# Patient Record
Sex: FEMALE | Race: WHITE
[De-identification: names, ages, dates, MRNs, and addresses within clinical notes are randomized per-mention and may not be internally consistent; named-entity substitution may affect disease eponyms.]

---

## 2018-12-24 ENCOUNTER — HOSPITAL ENCOUNTER (OUTPATIENT)
Dept: HOSPITAL 62 - LC | Age: 28
Discharge: HOME | End: 2018-12-24
Attending: OBSTETRICS & GYNECOLOGY
Payer: MEDICAID

## 2018-12-24 DIAGNOSIS — Z34.93: Primary | ICD-10-CM

## 2018-12-24 PROCEDURE — 4A1HXCZ MONITORING OF PRODUCTS OF CONCEPTION, CARDIAC RATE, EXTERNAL APPROACH: ICD-10-PCS | Performed by: OBSTETRICS & GYNECOLOGY

## 2018-12-24 PROCEDURE — 59025 FETAL NON-STRESS TEST: CPT

## 2019-01-17 ENCOUNTER — HOSPITAL ENCOUNTER (OUTPATIENT)
Dept: HOSPITAL 62 - LC | Age: 29
Discharge: HOME | End: 2019-01-17
Attending: OBSTETRICS & GYNECOLOGY
Payer: MEDICAID

## 2019-01-17 DIAGNOSIS — Z3A.35: ICD-10-CM

## 2019-01-17 DIAGNOSIS — O47.03: Primary | ICD-10-CM

## 2019-01-17 LAB
ADD MANUAL DIFF: NO
APPEARANCE UR: (no result)
APTT PPP: YELLOW S
BARBITURATES UR QL SCN: NEGATIVE
BASOPHILS # BLD AUTO: 0.1 10^3/UL (ref 0–0.2)
BASOPHILS NFR BLD AUTO: 0.6 % (ref 0–2)
BILIRUB UR QL STRIP: NEGATIVE
EOSINOPHIL # BLD AUTO: 0.1 10^3/UL (ref 0–0.6)
EOSINOPHIL NFR BLD AUTO: 0.8 % (ref 0–6)
ERYTHROCYTE [DISTWIDTH] IN BLOOD BY AUTOMATED COUNT: 16 % (ref 11.5–14)
GLUCOSE UR STRIP-MCNC: NEGATIVE MG/DL
HCT VFR BLD CALC: 35.4 % (ref 36–47)
HGB BLD-MCNC: 12.2 G/DL (ref 12–15.5)
KETONES UR STRIP-MCNC: NEGATIVE MG/DL
LYMPHOCYTES # BLD AUTO: 1.6 10^3/UL (ref 0.5–4.7)
LYMPHOCYTES NFR BLD AUTO: 11.2 % (ref 13–45)
MCH RBC QN AUTO: 29.9 PG (ref 27–33.4)
MCHC RBC AUTO-ENTMCNC: 34.4 G/DL (ref 32–36)
MCV RBC AUTO: 87 FL (ref 80–97)
METHADONE UR QL SCN: NEGATIVE
MONOCYTES # BLD AUTO: 0.8 10^3/UL (ref 0.1–1.4)
MONOCYTES NFR BLD AUTO: 5.5 % (ref 3–13)
NEUTROPHILS # BLD AUTO: 11.4 10^3/UL (ref 1.7–8.2)
NEUTS SEG NFR BLD AUTO: 81.9 % (ref 42–78)
NITRITE UR QL STRIP: NEGATIVE
PCP UR QL SCN: NEGATIVE
PH UR STRIP: 7 [PH] (ref 5–9)
PLATELET # BLD: 264 10^3/UL (ref 150–450)
PROT UR STRIP-MCNC: NEGATIVE MG/DL
RBC # BLD AUTO: 4.08 10^6/UL (ref 3.72–5.28)
SP GR UR STRIP: 1.01
TOTAL CELLS COUNTED % (AUTO): 100 %
URINE AMPHETAMINES SCREEN: NEGATIVE
URINE BENZODIAZEPINES SCREEN: NEGATIVE
URINE COCAINE SCREEN: NEGATIVE
URINE MARIJUANA (THC) SCREEN: NEGATIVE
UROBILINOGEN UR-MCNC: NEGATIVE MG/DL (ref ?–2)
WBC # BLD AUTO: 13.9 10^3/UL (ref 4–10.5)

## 2019-01-17 PROCEDURE — 94760 N-INVAS EAR/PLS OXIMETRY 1: CPT

## 2019-01-17 PROCEDURE — 4A1HXCZ MONITORING OF PRODUCTS OF CONCEPTION, CARDIAC RATE, EXTERNAL APPROACH: ICD-10-PCS | Performed by: OBSTETRICS & GYNECOLOGY

## 2019-01-17 PROCEDURE — 93010 ELECTROCARDIOGRAM REPORT: CPT

## 2019-01-17 PROCEDURE — 85025 COMPLETE CBC W/AUTO DIFF WBC: CPT

## 2019-01-17 PROCEDURE — 80307 DRUG TEST PRSMV CHEM ANLYZR: CPT

## 2019-01-17 PROCEDURE — 59025 FETAL NON-STRESS TEST: CPT

## 2019-01-17 PROCEDURE — 36415 COLL VENOUS BLD VENIPUNCTURE: CPT

## 2019-01-17 PROCEDURE — 81005 URINALYSIS: CPT

## 2019-01-17 PROCEDURE — 93005 ELECTROCARDIOGRAM TRACING: CPT

## 2019-01-17 NOTE — EKG REPORT
SEVERITY:- OTHERWISE NORMAL ECG -

SINUS TACHYCARDIA

:

Confirmed by: Alexx Li 17-Jan-2019 23:03:52

## 2019-01-17 NOTE — NON STRESS TEST REPORT
=================================================================

Non Stress Test

=================================================================

Datetime Report Generated by CPN: 2019 19:01

   

   

=================================================================

DEMOGRAPHIC

=================================================================

   

EGA NST:  35.4

   

=================================================================

INDICATION

=================================================================

   

Indication for Study:   labor

   

=================================================================

MONITORING

=================================================================

   

Monitor Explained:  Monitor Explained; Test Explained; Patient

   Verbalized Understanding

Time on Monitor:  2019 14:07

Time off Monitor:  2019 16:26

NST Duration:  139

   

=================================================================

NST INTERVENTIONS

=================================================================

   

NST Interventions:  PO Hydration; IV Fluids; Reposition Patient

Physician Notified NST:  Dr. Younger

   

=================================================================

BABY A

=================================================================

   

Fetal Movement :  Present

Contraction Frequency :  6-9

FHR Baseline :  145

Accelerations :  15X15

Decelerations :  None

Variability :  Moderate 6-25bpm

NST Review:  Meets Criteria for Reactive NST

NST Review and Verified By :  MINISTERIO Robbins RN

NST Results:  Reactive

   

=================================================================

NST REPORT

=================================================================

   

Report Trigger:  Send Report

## 2019-01-24 ENCOUNTER — HOSPITAL ENCOUNTER (INPATIENT)
Dept: HOSPITAL 62 - LC | Age: 29
LOS: 2 days | Discharge: HOME | End: 2019-01-26
Attending: OBSTETRICS & GYNECOLOGY | Admitting: OBSTETRICS & GYNECOLOGY
Payer: MEDICAID

## 2019-01-24 DIAGNOSIS — Z3A.36: ICD-10-CM

## 2019-01-24 LAB
ADD MANUAL DIFF: NO
APPEARANCE UR: (no result)
APTT PPP: YELLOW S
BARBITURATES UR QL SCN: NEGATIVE
BASOPHILS # BLD AUTO: 0.1 10^3/UL (ref 0–0.2)
BASOPHILS NFR BLD AUTO: 1.1 % (ref 0–2)
BILIRUB UR QL STRIP: NEGATIVE
CHLAM PCR: NOT DETECTED
EOSINOPHIL # BLD AUTO: 0.1 10^3/UL (ref 0–0.6)
EOSINOPHIL NFR BLD AUTO: 0.7 % (ref 0–6)
ERYTHROCYTE [DISTWIDTH] IN BLOOD BY AUTOMATED COUNT: 15.9 % (ref 11.5–14)
GLUCOSE UR STRIP-MCNC: >=500 MG/DL
GON PCR: NOT DETECTED
HCT VFR BLD CALC: 38.2 % (ref 36–47)
HGB BLD-MCNC: 13 G/DL (ref 12–15.5)
KETONES UR STRIP-MCNC: NEGATIVE MG/DL
LYMPHOCYTES # BLD AUTO: 1.9 10^3/UL (ref 0.5–4.7)
LYMPHOCYTES NFR BLD AUTO: 14.6 % (ref 13–45)
MCH RBC QN AUTO: 29.6 PG (ref 27–33.4)
MCHC RBC AUTO-ENTMCNC: 34.1 G/DL (ref 32–36)
MCV RBC AUTO: 87 FL (ref 80–97)
METHADONE UR QL SCN: NEGATIVE
MONOCYTES # BLD AUTO: 0.8 10^3/UL (ref 0.1–1.4)
MONOCYTES NFR BLD AUTO: 6.3 % (ref 3–13)
NEUTROPHILS # BLD AUTO: 9.8 10^3/UL (ref 1.7–8.2)
NEUTS SEG NFR BLD AUTO: 77.3 % (ref 42–78)
NITRITE UR QL STRIP: NEGATIVE
PCP UR QL SCN: NEGATIVE
PH UR STRIP: 7 [PH] (ref 5–9)
PLATELET # BLD: 227 10^3/UL (ref 150–450)
PROT UR STRIP-MCNC: 100 MG/DL
RBC # BLD AUTO: 4.41 10^6/UL (ref 3.72–5.28)
SP GR UR STRIP: 1.01
TOTAL CELLS COUNTED % (AUTO): 100 %
URINE AMPHETAMINES SCREEN: NEGATIVE
URINE BENZODIAZEPINES SCREEN: NEGATIVE
URINE COCAINE SCREEN: NEGATIVE
URINE MARIJUANA (THC) SCREEN: NEGATIVE
UROBILINOGEN UR-MCNC: NEGATIVE MG/DL (ref ?–2)
WBC # BLD AUTO: 12.7 10^3/UL (ref 4–10.5)

## 2019-01-24 PROCEDURE — 86850 RBC ANTIBODY SCREEN: CPT

## 2019-01-24 PROCEDURE — 86901 BLOOD TYPING SEROLOGIC RH(D): CPT

## 2019-01-24 PROCEDURE — 87491 CHLMYD TRACH DNA AMP PROBE: CPT

## 2019-01-24 PROCEDURE — 86592 SYPHILIS TEST NON-TREP QUAL: CPT

## 2019-01-24 PROCEDURE — 36415 COLL VENOUS BLD VENIPUNCTURE: CPT

## 2019-01-24 PROCEDURE — 85027 COMPLETE CBC AUTOMATED: CPT

## 2019-01-24 PROCEDURE — 87591 N.GONORRHOEAE DNA AMP PROB: CPT

## 2019-01-24 PROCEDURE — 82962 GLUCOSE BLOOD TEST: CPT

## 2019-01-24 PROCEDURE — 80307 DRUG TEST PRSMV CHEM ANLYZR: CPT

## 2019-01-24 PROCEDURE — 88307 TISSUE EXAM BY PATHOLOGIST: CPT

## 2019-01-24 PROCEDURE — 86900 BLOOD TYPING SEROLOGIC ABO: CPT

## 2019-01-24 PROCEDURE — 81001 URINALYSIS AUTO W/SCOPE: CPT

## 2019-01-24 PROCEDURE — 85025 COMPLETE CBC W/AUTO DIFF WBC: CPT

## 2019-01-24 RX ADMIN — SENNOSIDES, DOCUSATE SODIUM SCH: 50; 8.6 TABLET, FILM COATED ORAL at 13:57

## 2019-01-24 RX ADMIN — FERROUS SULFATE TAB 325 MG (65 MG ELEMENTAL FE) SCH: 325 (65 FE) TAB at 13:57

## 2019-01-24 RX ADMIN — IBUPROFEN SCH MG: 800 TABLET, FILM COATED ORAL at 21:16

## 2019-01-24 RX ADMIN — DOCUSATE SODIUM SCH: 100 CAPSULE, LIQUID FILLED ORAL at 13:57

## 2019-01-24 RX ADMIN — FERROUS SULFATE TAB 325 MG (65 MG ELEMENTAL FE) SCH MG: 325 (65 FE) TAB at 18:26

## 2019-01-24 RX ADMIN — IBUPROFEN SCH MG: 800 TABLET, FILM COATED ORAL at 15:30

## 2019-01-24 RX ADMIN — Medication SCH: at 13:57

## 2019-01-24 RX ADMIN — DOCUSATE SODIUM SCH MG: 100 CAPSULE, LIQUID FILLED ORAL at 18:26

## 2019-01-24 NOTE — WARNING SIGNS IN BABIES
=================================================================

VOD Warning Signs

=================================================================

Datetime Report Generated by [facility]: 01/24/2019 11:30

   

VOD#608 -Warning Signs in Babies:  Needs to be viewed.    (12/24/2018

   08:47:Linda Ortiz RN)

## 2019-01-24 NOTE — DELIVERY SUMMARY
=================================================================

Del Sum A-C

=================================================================

Datetime Report Generated by CPN: 2019 11:32

   

   

=================================================================

DELIVERY PERSONNEL

=================================================================

   

DELIVERY PERSONNEL:  [id number]

Delivery Doctor::  Jalyn Pool CNM

Labor and Delivery Nurse::  José Morocho RN

Labor and Delivery Nurse::  Linda Alford RN

Nursery Nurse::  Haylie Richmond RN

Nursery Nurse::  Devika

Student Observers::  Rima Casey/MARANDA:  Hayley Eaton CNA II

   

=================================================================

MATERNAL INFORMATION

=================================================================

   

Delivery Anesthesia:  Epidural

Medications After Delivery:  Pitocin Drip 20 Units/1000ml NSS

Maternal Complications:  Precipitous Labor (<3hrs)

Provider Comments:   of viable female infant, KEYLA, loose NC x 1.

   Baby vigorous and crying, placed on pts abdoman. Cord cut and

   clamped after one minute. PCN x 2 doses infused for unknown GBS

   status.  Perinuem intact. Placenta S/C/I, ffw/ decreased lochia.

   Mother and baby in stable condition, pt plans to breast and bottle

   feed.  Minimal bleeding. Apgars 8,9

   Attending MD is Dr Venegas

   

=================================================================

LABOR SUMMARY

=================================================================

   

EDC:  2019 00:00

No. Babies in Womb:  1

 Attempted:  No

Labor Anesthesia:  Epidural

   

=================================================================

LABOR INFORMATION

=================================================================

   

Reason for Induction:  Not Applicable

Onset of Labor:  2019 04:32

Complete Dilatation:  2019 08:54

Oxytocin:  N/A

Group B Beta Strep:  Unknown

Antibiotics # of Doses:  2

Antibiotics Time of Last Dose:  0849

Name of Antibiotic Given:  PCN

Steroids Given:  None

Reason Steroids Not Administered:  Not Applicable

   

=================================================================

MEMBRANES

=================================================================

   

Membranes Rupture Method:  Spontaneous

Rupture of Membranes:  2019 03:00

Length of Rupture (hr):  6.05

Amniotic Fluid Color:  Clear

Amniotic Fluid Amount:  Moderate

Amniotic Fluid Odor:  Normal

   

=================================================================

STAGES OF LABOR

=================================================================

   

Stage 1 hr:  4

Stage 1 min:  22

Stage 2 hr:  0

Stage 2 min:  9

Stage 3 hr:  0

Stage 3 min:  3

Total Time in Labor hr:  4

Total Time in Labor min:  34

   

=================================================================

VAGINAL DELIVERY

=================================================================

   

Episiotomy:  None

Laceration #1:  None

Laceration Extension #1:  N/A

Laceration Repair:  Not Applicable

Sponge Count Correct:  N/A

   

=================================================================

CSECTION DELIVERY

=================================================================

   

Primary Indication:  N/A

Secondary Indication:  N/A

CSection Incidence:  N/A

Labor:  N/A

Elective:  N/A

CSection Incision:  N/A

   

=================================================================

BABY A INFORMATION

=================================================================

   

Infant Delivery Date/Time:  2019 09:03

Method of Delivery:  Vaginal

Born in Route :  No

:  N/A

Forceps:  N/A

Vacuum Extraction:  N/A

Shoulder Dystocia :  No

   

=================================================================

PRESENTATION/POSITION BABY A

=================================================================

   

Presentation:  Cephalic

Cephalic Presentation:  Vertex

Vertex Position:  Left Occipital Anterior

Breech Presentation:  N/A

   

=================================================================

PLACENTA INFORMATION BABY A

=================================================================

   

Placenta Delivery Time :  2019 09:06

Placenta Method of Delivery:  Spontaneous

Placenta Status:  Delivered

   

=================================================================

APGAR SCORES BABY A

=================================================================

   

Heart Rate 1 min:  >100 bpm

Resp Effort 1 min:  Good Cry

Reflex Irritability 1 min:  Cough or Sneeze or Pulls Away

Muscle Tone 1 min:  Active Motion

Color 1 min:  Blue/Pale

Resuscitation Effort 1 min:  Tactile Stimulation

APGAR SCORE 1 MIN:  8

Heart Rate 5 min:  >100 bpm

Resp Effort 5 min:  Good Cry

Reflex Irritability 5 min:  Cough or Sneeze or Pulls Away

Muscle Tone 5 min:  Active Motion

Color 5 min:  Body Pink, Extremities Blue

APGAR SCORE 5 MIN:  9

   

=================================================================

INFANT INFORMATION BABY A

=================================================================

   

Gestational Age at Delivery:  36.4

Gestational Status:  Late - 34- 36.6 Weeks

Infant Outcome :  Liveborn

Infant Condition :  Stable

Infant Sex:  Female

   

=================================================================

IDENTIFICATION BABY A

=================================================================

   

Infant Verification Date/Time:  2019 09:20

ID Band Number:  [id number]

Mother's Name Verified:  Yes

Infant Medical Record Number:  [id number]

RN Verifying Infant:  CSarah alford, RN/ JSarah Morocho, RN

   

=================================================================

CORD INFORMATION BABY A

=================================================================

   

No. Cord Vessels:  3

Nuchal Cord :  Around Neck x2, Loose

Cord Blood Taken:  Yes-For Eval (Mom's Blood Type - or O+)

Infant Suction:  None

   

=================================================================

ASSESSMENT BABY A

=================================================================

   

Infant Complications:  None

Physical Findings at Delivery:  Within Normal Limits

Infant Respirations:  Appears Normal

Skin to Skin:  Yes

Neonatologist/ALS Called :  No

Infant Care By:  BALDEMAR Richmond RN

Transferred To:  Remains with Mother

   

=================================================================

SIGNATURES

=================================================================

   

Assignment:  Michael Venegas MD

Signature:  Electronically signed by Jalyn Pool CNM on 2019

   at 09:56  with User ID: [email or web address]

:  Electronically signed by Jalyn Pool CNM on 2019 at 09:56 

   with User ID: [email or web address]

## 2019-01-24 NOTE — ADMISSION PHYSICAL
=================================================================



=================================================================

Datetime Report Generated by CPN: 2019 05:49

   

   

=================================================================

CURRENT ADMISSION

=================================================================

   

Chief Complaint:  Uterine Contractions; Suspected Ruptured Membranes

Indication for Induction:  Not Applicable

Admit Impression :  , Intrauterine Pregnancy; Ruptured Membranes

Admit Plan:  Admit to Unit; Initiate Labor Protocol

   

=================================================================

ALLERGIES

=================================================================

   

Medication Allergies:  No

Medication Allergies:  No Known Allergies (2019)

Latex:  No Latex Allergies

   

=================================================================

OBSTETRICAL HISTORY

=================================================================

   

EDC:  2019 00:00

:  3

Para:  2

Term:  0

:  2

SAB:  0

IAB:  0

Ectopic:  0

Livin

Cesareans:  0

VBACs:  0

Multiple Births:  0

Gestational Diabetes:  Yes

Rh Sensitization:  No

Incompetent Cervix:  Yes

KURT:  No

Infertility:  No

ART Treatment:  No

Uterine Anomaly:  No

IUGR:  No

Hx Previous C/S:  No

Macrosomia:  No

Hx Loss/Stillborn:  No

PIH:  No

Hx  Death:  No

Placenta Previa/Abruption:  No

Depression/PP Depression:  No

PTL/PROM:  No

Post Partum Hemorrhage:  No

Current Pregnancy Procedures:  Ultrasound; NST

Obstetrical History Comments:  G1:  34 weeks 

   G2:  36 weeks  : current: on P17

   

=================================================================

***SEE PRENATAL RECORDS***

=================================================================

   

Alcohol:  No

Marijuana :  No

Cocaine:  No

Other Illicit Drugs:  No

Cigarettes:  Never Smoker. [id number]

   

=================================================================

MEDICAL HISTORY

=================================================================

   

Diabetes:  Yes

Diabetes Type:  Gestational Diabetes

Blood Transfusion:  No

Pulmonary Disease (Asthma, TB):  No

Breast Disease:  No

Hypertension:  No

Gyn Surgery:  No

Heart Disease:  No

Hosp/Surgery:  No

Autoimmune Disorder:  No

Anesthetic Complications:  No

Kidney Disease:  Yes

Abnormal Pap Smear:  No

Neuro/Epilepsy:  No

Psychiatric Disorders:  No

Other Medical Diseases:  No

Hepatitis/Liver Disease:  No

Significant Family History:  No

Varicosities/Phlebitis:  No

Trauma/Violence :  No

Thyroid Dysfunction:  No

   

=================================================================

INFECTIOUS HISTORY

=================================================================

   

Gonorrhea:  No

Genital Herpes:  No

Chlamydia:  No

Tuberculosis:  No

Syphilis:  No

Hepatitis:  No

HIV/AIDS Exposure:  No

Rash or Viral Illness:  No

HPV:  No

   

=================================================================

PHYSICAL EXAM

=================================================================

   

General:  Normal

HEENT:  Normal

Neurologic:  Normal

Thyroid:  Normal

Heart:  Normal

Lungs:  Normal

Breast:  Deferred

Back:  Normal

Abdomen:  Normal

Genitourinary Exam:  Normal

Extremities:  Normal

DTRs:  Normal

Pelvic Type:  Adequate

Vital Signs:  Reviewed

   

=================================================================

VAGINAL EXAM

=================================================================

   

Dilatation:  3

Effacement:  80

Station:  -2

   

=================================================================

MEMBRANES

=================================================================

   

Pooling:  Negative

Membranes:  Intact

   

=================================================================

FETUS A

=================================================================

   

EGA:  36.4

Monitoring:  External US

FHR- Baseline:  140

Variability:  Moderate 6-25bpm

Decelerations:  None

FHR Category:  Category I

Fetal Presentation:  Vertex

Admit Comment:  Admit for delivery

   

=================================================================

PLANS FOR LABOR AND DELIVERY

=================================================================

   

Labor and Delivery:  Birth Plan

Pain Management:  Epidural

Feeding Preference:  Both

Benefit of Breast Feed Discussed:  Yes

Circumcision:  N/A

   

=================================================================

INFORMED CONSENT

=================================================================

   

Signature:  Electronically signed by Juan Vidal MD ([email or web address]) on

   2019 at 05:48  with User ID: [email or web address]

## 2019-01-25 LAB
ERYTHROCYTE [DISTWIDTH] IN BLOOD BY AUTOMATED COUNT: 15.9 % (ref 11.5–14)
HCT VFR BLD CALC: 38.6 % (ref 36–47)
HGB BLD-MCNC: 13 G/DL (ref 12–15.5)
MCH RBC QN AUTO: 29.5 PG (ref 27–33.4)
MCHC RBC AUTO-ENTMCNC: 33.7 G/DL (ref 32–36)
MCV RBC AUTO: 87 FL (ref 80–97)
PLATELET # BLD: 202 10^3/UL (ref 150–450)
RBC # BLD AUTO: 4.42 10^6/UL (ref 3.72–5.28)
WBC # BLD AUTO: 13.4 10^3/UL (ref 4–10.5)

## 2019-01-25 RX ADMIN — SENNOSIDES, DOCUSATE SODIUM SCH EACH: 50; 8.6 TABLET, FILM COATED ORAL at 10:31

## 2019-01-25 RX ADMIN — IBUPROFEN SCH MG: 800 TABLET, FILM COATED ORAL at 05:44

## 2019-01-25 RX ADMIN — IBUPROFEN SCH MG: 800 TABLET, FILM COATED ORAL at 14:27

## 2019-01-25 RX ADMIN — IBUPROFEN SCH MG: 800 TABLET, FILM COATED ORAL at 23:32

## 2019-01-25 RX ADMIN — Medication SCH CAP: at 10:31

## 2019-01-25 RX ADMIN — DOCUSATE SODIUM SCH MG: 100 CAPSULE, LIQUID FILLED ORAL at 10:31

## 2019-01-25 RX ADMIN — FERROUS SULFATE TAB 325 MG (65 MG ELEMENTAL FE) SCH MG: 325 (65 FE) TAB at 10:31

## 2019-01-26 VITALS — DIASTOLIC BLOOD PRESSURE: 66 MMHG | SYSTOLIC BLOOD PRESSURE: 120 MMHG

## 2019-01-26 RX ADMIN — IBUPROFEN SCH MG: 800 TABLET, FILM COATED ORAL at 13:21

## 2019-01-26 RX ADMIN — FERROUS SULFATE TAB 325 MG (65 MG ELEMENTAL FE) SCH MG: 325 (65 FE) TAB at 11:51

## 2019-01-26 RX ADMIN — FERROUS SULFATE TAB 325 MG (65 MG ELEMENTAL FE) SCH MG: 325 (65 FE) TAB at 11:52

## 2019-01-26 RX ADMIN — DOCUSATE SODIUM SCH MG: 100 CAPSULE, LIQUID FILLED ORAL at 18:03

## 2019-01-26 RX ADMIN — DOCUSATE SODIUM SCH MG: 100 CAPSULE, LIQUID FILLED ORAL at 11:51

## 2019-01-26 RX ADMIN — SENNOSIDES, DOCUSATE SODIUM SCH EACH: 50; 8.6 TABLET, FILM COATED ORAL at 11:52

## 2019-01-26 RX ADMIN — FERROUS SULFATE TAB 325 MG (65 MG ELEMENTAL FE) SCH MG: 325 (65 FE) TAB at 18:03

## 2019-01-26 RX ADMIN — Medication SCH CAP: at 11:50

## 2019-01-26 RX ADMIN — IBUPROFEN SCH: 800 TABLET, FILM COATED ORAL at 07:00

## 2019-01-26 NOTE — PDOC PROGRESS REPORT
Subjective-OB


Progress Note for:: 19


Subjective: 





Pt seen in NICU, bonding with baby. She was not available in room yesterday. She

denies complaints.  Light bleeding, reg diet and voiding without difficulty. 





Physical Exam (OB)


Vital Signs: 


                                        











Temp Pulse Resp BP Pulse Ox


 


 97.7 F   59 L  16   120/66   100 


 


 19 07:40  19 07:40  19 07:40  19 07:40  19 07:40














- Lochia


Lochia Amount: Scant < 10 ml


Lochia Color: Rubra/Red





- Abdomen


Description: Soft, Round


Hernia Present: No


Fundal Description: Non-Midline


Fundal Height: u/u - u/2





Objective-Diagnostic


Laboratory: 


                                        





                                 19 06:40 











Assessment and Plan(PN)





- Assessment and Plan


(1)  labor in second trimester with  delivery in third trimester


Qualifiers: 


   Fetus number: single or unspecified fetus   Qualified Code(s): O60.13X0 - 

 labor second trimester with  delivery third trimester, not 

applicable or unspecified   


Is this a current diagnosis for this admission?: Yes   





(2) Vaginal delivery


Is this a current diagnosis for this admission?: Yes   





- Time Spent with Patient


Time with patient: Less than 15 minutes


Medications reviewed and adjusted accordingly: Yes





- Disposition


Anticipated Discharge: Home


Within: within 24 hours

## 2019-01-26 NOTE — PDOC DISCHARGE SUMMARY
Final Diagnosis


Discharge Date: 19





- Final Diagnosis


(1)  labor in second trimester with  delivery in third trimester


Is this a current diagnosis for this admission?: Yes   





(2) Vaginal delivery


Is this a current diagnosis for this admission?: Yes   





Discharge Data





- Discharge Medication


Home Medications: 








Prenatal No122/Iron/Folic Acid [Prenatal Multi Tablet] 1 each PO DAILY 18 








Reason(s) for Admission: Onset of Labor


Prenatal Procedures: NST


Intrapartum Procedure(s): Spontaneous Vaginal Delivery





- Diagnosis Test


Laboratory: 


                                        











Temp Pulse Resp BP Pulse Ox


 


 98.1 F   72   18   131/81 H  97 


 


 19 08:58  19 08:58  19 08:58  19 08:58  19 08:58








                                        











  19





  04:55 05:00 06:40


 


RBC  4.41   4.42


 


Hgb  13.0   13.0


 


Hct  38.2   38.6


 


Urine Opiates Screen   NEGATIVE 














- Discharge information/Instructions


Discharge Activity: Balance Activity w/Rest, Pelvic Rest


Discharge Diet: Regular


Disposition: HOME, SELF-CARE


Follow up with: [facility]


in: 3, 4, Weeks